# Patient Record
(demographics unavailable — no encounter records)

---

## 2024-10-21 NOTE — HEALTH RISK ASSESSMENT
[0] : 2) Feeling down, depressed, or hopeless: Not at all (0) [Never] : Never [PHQ-2 Negative - No further assessment needed] : PHQ-2 Negative - No further assessment needed [Time Spent: ___ Minutes] : I spent [unfilled] minutes performing a depression screening for this patient. [[id number]] : 0

## 2024-10-21 NOTE — PHYSICAL EXAM
[Normal] : affect was normal and insight and judgment were intact [de-identified] : erythema over right heel

## 2024-10-21 NOTE — HEALTH RISK ASSESSMENT
[0] : 2) Feeling down, depressed, or hopeless: Not at all (0) [Never] : Never [PHQ-2 Negative - No further assessment needed] : PHQ-2 Negative - No further assessment needed [Time Spent: ___ Minutes] : I spent [unfilled] minutes performing a depression screening for this patient. [[id number]] : 0

## 2024-10-21 NOTE — HISTORY OF PRESENT ILLNESS
[FreeTextEntry1] : Pt is here for a follow up visit.  [de-identified] : 81M PMHx HTN, HLD, degenerative lumbar disc disease, gout, BPH, and cataracts s/p surgery in 2023, here most recently in 9/2024 for acute gout flair s/p colchicine, presents for follow up appointment. Daughter present, translated for us. Items discussed today:  1) Gout. His recent acute gout flair of his R foot resolved with colchicine, but he had a second milder and self-resolving attack on his R heel. He complained of residual mild pain and redness over the affected area.  2) Back pain. He complained of chronic, stable, mild-moderate lower back pain with radiation down both legs, improves with leaning forward, ambulates with walker. Trialed PT, but says didn't help. Using OTC pain med. Not interested in surgery.   3) HTN. Takes his losartan 100mg, home BP 120s/70s.   4) Allergies. Has seasonal allergies, wants fluticasone propionate nasal spray 50mcg refill.   5) Hearing. Complained of chronic, progressive b/l hearing loss over years. Daughter confirmed he has difficulty with hearing, sometimes doesn't answer. Would like to see if hearing aids a possibility.

## 2024-10-21 NOTE — PHYSICAL EXAM
[Normal] : affect was normal and insight and judgment were intact [de-identified] : erythema over right heel

## 2024-10-21 NOTE — HISTORY OF PRESENT ILLNESS
[FreeTextEntry1] : Pt is here for a follow up visit.  [de-identified] : 81M PMHx HTN, HLD, degenerative lumbar disc disease, gout, BPH, and cataracts s/p surgery in 2023, here most recently in 9/2024 for acute gout flair s/p colchicine, presents for follow up appointment. Daughter present, translated for us. Items discussed today:  1) Gout. His recent acute gout flair of his R foot resolved with colchicine, but he had a second milder and self-resolving attack on his R heel. He complained of residual mild pain and redness over the affected area.  2) Back pain. He complained of chronic, stable, mild-moderate lower back pain with radiation down both legs, improves with leaning forward, ambulates with walker. Trialed PT, but says didn't help. Using OTC pain med. Not interested in surgery.   3) HTN. Takes his losartan 100mg, home BP 120s/70s.   4) Allergies. Has seasonal allergies, wants fluticasone propionate nasal spray 50mcg refill.   5) Hearing. Complained of chronic, progressive b/l hearing loss over years. Daughter confirmed he has difficulty with hearing, sometimes doesn't answer. Would like to see if hearing aids a possibility.

## 2024-10-21 NOTE — ASSESSMENT
[FreeTextEntry1] : 81M PMHx HTN, HLD, degenerative lumbar disc disease, gout, BPH, and cataracts s/p surgery in 2023, here most recently in 9/2024 for acute gout flair s/p colchicine, presents for follow up appointment. Daughter present, translated for us.  #Gout - having recent acute gout flares, despite previous remission on allopurinol for years. first flare in 9/2024 managed with colchicine. presenting today on tail-end of second, milder flare of R heel that he managed with OTC pain meds - plan is to up-titrate allopurinol dose according to uric acid level, which we got today, to prevent future flares  #Degenerative disc disease - degenerative lumbar disc disease with multi-level disc herniation w/o spinal stenosis per recent MRI ordered by PM&R that he sees of back pain control. causing him chronic but stable mild-moderate lumbar back pain that radiates down b/l legs, forcing him to use walker to ambulate, PT ineffective. Not interest in surgery - plan is conservative pain management, exercise  #HTN - chronic, stable, controlled - c/w losartan 100mg  #Hearing loss - chronic, progressive b/l hearing loss, most likely presbycusis - audiology referral  #Seasonal allergies - wants fluticasone propionate nasal spray 50mcg refill  #BPH - chronic, stable, controlled on tamsulosin 0.4mg - c/w home med  #HLD - c/w atorvastatin 20mg  #Cataract - s/p cataract surgery in 2023

## 2024-10-21 NOTE — END OF VISIT
[] : Resident [FreeTextEntry3] : 81M w/htn, gout, hld, bph, chronic back pain here for f/u. HTN - repeat SBP 150s, pt to bring monitor at next visit, c/w current regiment Gout - 2 flares in last month, first required colchicine, check uric acid level, increase Allopurinol 200mg. If continues to occur, will refer to rheum Chronic back pain - seeing PMR

## 2024-11-03 NOTE — REVIEW OF SYSTEMS
[Patient Intake Form Reviewed] : Patient intake form was reviewed Imiquimod Counseling:  I discussed with the patient the risks of imiquimod including but not limited to erythema, scaling, itching, weeping, crusting, and pain.  Patient understands that the inflammatory response to imiquimod is variable from person to person and was educated regarded proper titration schedule.  If flu-like symptoms develop, patient knows to discontinue the medication and contact us.

## 2024-11-04 NOTE — ASSESSMENT
[FreeTextEntry1] :                                                       Assessment/Plan:   DARREN GROSS is a 81 year male with chronic low back pain here for follow up telemedicine visit  Used  Julio Cesar [id number]   Arthritis of right midfoot (715.97) (M19.071) Neurogenic claudication due to lumbar spinal stenosis (724.03) (M48.062) Compression fracture of L2 vertebra, initial encounter (805.4) (S32.020A) Impairment of balance (781.2) (R26.89) Tendinopathy of gluteus medius (727.9) (M67.959) Weakness of hip (729.89) (R29.898) Myofascial pain syndrome of lumbar spine (729.1) (M79.18)  Neurogenic claudication due to lumbar spinal stenosis Chronic lumbar radiculopathy, L4, right Decreased ROM of lumbar spine Tendinopathy of gluteus medius, B/L History of vertebral fracture (traumatic) Balance impairment  Intermittent claudication, arterial insufficiency (?)  Unsteady gait   Bilateral leg numbness Dyspnea on exertion H/O Gout H/O osteopenia [DEXA 2021]   - Tiers of treatment and management of above diagnosis(es) were discussed with patient - Optimal diet, weight, sleep, and lifestyle management to minimize stress and maximize well being counseling provided - Imaging reviewed and discussed with patient - Reviewed previous encounter notes from 5/1/2024 Dr. BAILEY Fermin (Internal Medicine) and 4/29/2024 Dr. Colbert (Internal Medicine) - Nov 04, 2024 patient was advised to RESUME a structured, targeted therapy program 2-3x/wk for 8 wks with goal toward HEP - Patient was educated on an appropriate home exercise program, provided with exercise recommendations, all questions answered - Patient may trial acetaminophen 1000mg up to TID PRN moderate to severe pain and to decrease average consumption of NSAIDs - Nov 04, 2024 : Patient was advised to start gabapentin for their neuropathic pain symptoms. Patient was instructed to begin with 100mg at bedtime for the next week. If well tolerated, patient will double their nightly dose to 200mg at bedtime. After another week, if the medication is well tolerated, they will commence 300mg at bedtime. Patient provided with written instructions as well. All questions were answered and the patient displayed a clear understanding of the plan of care, including titration of gabapentin. The patient was informed about not taking this medication at the same time as any sleeping or muscle relaxant pills. Pt was also notified to stop, and contact our office, if it is too sedating, ankle swelling occurs and/or they experience suicidal thinking. - Patient was advised to apply cool compresses or warm heat to affected regions PRN - Ravi 10, 2024: Vascular Surgery: years of progressively worsening claudication symptoms, lower limb stigmata of arterial insufficiency, evaluate and treat; noted interval consultation on Nov 04, 2024 from 7/10/2024 without indication for surgical intervention - Radiographs of LUMBOSACRAL and HIPS region ordered 06/10/2024 - Nov 04, 2024 referral to Podiatry for midfoot arthritis  - Educated about red flag symptoms including (but not limited to) new, worsened, or persistent: fever greater than 100F, bowel or bladder incontinence, bowel obstipation, inability to void urine, urinary leakage, Severe nausea or vomiting, Worsening numbness, worsening tingling/paresthesias, and/or new or progressive motor weakness; advised to seek immediate medical attention at his nearest Emergency department should they experience any of the above    - Follow up in person in 3-4 months to assess their progress with new PT program, asses effect of gabapentin, review interval consultations with Podiatry and Orthopedic Surgery Spine Surgery, assess need for local treatment   I have personally spent a total of at least 45 minutes preparing, reviewing internal and external records, explaining, counseling, providing necessary information via documented paperwork for this encounter, and coordinating care for this patient encounter.   Thank you, Dr(s), for allowing me to participate in the care of your patient. Please do not hesitate to contact me with questions/concerns.   Antony Morgan M.D. Sports and Spine Department of Physical Medicine and Rehabilitation [facility] [address] [place] [address]   Appointments: [phone] Fax: [phone]

## 2024-11-04 NOTE — DATA REVIEWED
[FreeTextEntry1] : ACC: [id number]     EXAM:  MR SPINE LUMBAR   ORDERED BY: OLLIE SUNITA PROCEDURE DATE:  08/26/2024 NTERPRETATION:  CLINICAL INFORMATION: Remote fracture. ADDITIONAL CLINICAL INFORMATION: Scoliosis M41.9 TECHNIQUE: Multiplanar, multisequence MRI was performed of the lumbar spine. IV Contrast: NONE  PRIOR STUDIES: No priors available for comparison.  FINDINGS:  There is a moderate wedge chronic compression fracture of the L2 vertebral body. There is no evidence of retropulsion of fracture fragments. There is posterior curvature/reversal of the lumbar spine  with apex at the L2 level. There is mild levoscoliosis of the lower thoracolumbar spine with apex at approximately L2/L3. There is mild exaggeration of the lower lumbar  lordosis. There are anterior bridging osteophytes.. The remaining vertebral bodies are normal height. There is loss of T2 signal throughout the lumbar disc spaces with anterior osteophyte formation consistent with mild desiccation and degenerative changes.. There is T1 and T2 hyperintensity throughout   the L1/L2 disc space that likely represents calcification. The conus terminates at the L1/L2 level and demonstrates no evidence of abnormal signal changes. There is a small 0.8 cm Tarlov cyst seen at S2. There is mild diffuse T1 and T2 signal hyperintensity throughout the bone marrow that may represent osteopenia. Evaluation of the individual levels demonstrates at the L5/S1 level there is a left asymmetric diffuse disc bulge flattening the ventral thecal sac. There is mild bilateral foraminal narrowing. The bilateral L5 foraminal exiting nerve roots are within normal limits. There is moderate facet and ligamentous hypertrophy. There is no evidence of spinal canal stenosis. At the L4/L5 level there is a mild diffuse disc bulge flattening the ventral thecal sac and contacting the left descending L5 nerve root. There is left lateral recess narrowing. There is mild-to-moderate bilateral foraminal narrowing. The bilateral L4 foraminal exiting nerve roots appear within normal limits. There is moderate facet hypertrophy. There is no evidence of spinal canal stenosis. At the L3/L4 level there is a mild diffuse disc bulge flattening the ventral thecal sac and mildly contacting the left descending L4 nerve root. There is mild left lateral recess narrowing.. There is moderate bilateral  foraminal narrowing. The bilateral L3 foraminal exiting nerve roots are likely within normal limits. There is moderate facet and ligamentous hypertrophy. There is no evidence of spinal canal stenosis. At the L2/L3 level there is no evidence of a focal disc herniation. There is mild left foraminal narrowing the right neuroforamen is patent. The bilateral L2 foraminal exiting nerve roots are within normal limits. There is moderate facet  hypertrophy. There is no evidence of spinal canal stenosis. At the L1/L2 level there is a right foraminal and far lateral broad-based disc protrusion and osteophyte mildly narrowing the right neural foramen. The left neuroforamen is patent. There is moderate facet and ligamentous hypertrophy. There is no evidence of spinal canal stenosis. Impression: Moderate wedge chronic compression fracture of the L2 vertebral body with no evidence of retropulsion of fracture fragments. Posterior curvature/reversal of the lumbar spine  with apex at the L2 level. Mild levoscoliosis of the lower thoracolumbar spine with apex at approximately L2/L3. Mild exaggeration of the lower lumbar  lordosis. Degenerative changes of the lumbar spine. L5/S1  left asymmetric diffuse disc bulge. L5/S1 no evidence of spinal canal stenosis. L4/L5  mild diffuse disc bulge  contacting the left descending L5 nerve root.  L4/L5 no evidence of spinal canal stenosis. L3/L4  mild diffuse disc bulge  mildly contacting the left descending L4 nerve root. L3/L4 no evidence of spinal canal stenosis. L2/L3  no evidence of a focal disc herniation. L2/L3 no evidence of spinal canal stenosis. L1/L2 right foraminal and far lateral broad-based disc protrusion and osteophyte mildly narrowing the right neural foramen. L1/L2 no evidence of spinal canal stenosis.

## 2024-11-04 NOTE — REASON FOR VISIT
[Home] : at home, [unfilled] , at the time of the visit. [Medical Office: ([facility])___] : at the medical office located in  [Patient] : the patient [Follow-Up] : a follow-up visit [Family Member] : family member
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
## 2024-11-08 NOTE — DISCUSSION/SUMMARY
[de-identified] : Reviewed the natural history of adult spinal deformity at length.  Reviewed all available treatment options.  From a surgical perspective I do feel that this would likely require multilevel realignment and fusion procedure.  Reviewed the relative risk benefits alternatives anticipated recovery from this.  Presently we have agreed upon a new course of physical therapy and chiropractic treatment attempt to strengthen the musculature of the mid low back and legs.  Will follow-up in 2 to 3 months to reassess his symptoms and progress  I have spent greater than 60 minutes preparing to see the patient, collecting relevant history, performing a thorough history and physical examination, counseling the patient regarding my findings ordering the appropriate therapies and tests, communicating with other relevant healthcare professionals, documenting my encounter and coordinating care. 36.9
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
## 2024-11-08 NOTE — PHYSICAL EXAM
[Stooped] : stooped [UE/LE] : Sensory: Intact in bilateral upper & lower extremities [Normal DTR Reflexes] : DTR reflexes normal [Normal Proprioception] : sensation intact for proprioception [Normal] : Oriented to person, place, and time, insight and judgement were intact and the affect was normal [de-identified] : MRI lumbar spine LHR Consistent with chronic compression fracture deformity at L2 with segmental kyphosis  AP lateral scoliosis x-ray PACS 11/8/2024 Significant loss of lumbar lordosis with focal thoracolumbar kyphosis posterior pelvic tilts and compensatory flattening of thoracic kyphosis

## 2024-11-08 NOTE — ASSESSMENT
[FreeTextEntry1] : 81-year-old male with thoracolumbar kyphosis due to chronic L2 compression fracture sagittal plane imbalance, symptomatic

## 2025-02-10 NOTE — HISTORY OF PRESENT ILLNESS
[FreeTextEntry1] : htn [de-identified] : 81M PMHx HTN, HLD, degenerative lumbar disc disease, gout, BPH, and cataracts s/p surgery in 2023 who presents for follow up of BP. Takes BP at home with BP cuff, sometimes sees 150s. Sometimes has light transient headache. Takes medication every day. Drinks 2 cups of coffee. Does not snore.

## 2025-02-10 NOTE — HEALTH RISK ASSESSMENT
[Little interest or pleasure doing things] : 1) Little interest or pleasure doing things [Feeling down, depressed, or hopeless] : 2) Feeling down, depressed, or hopeless [0] : 2) Feeling down, depressed, or hopeless: Not at all (0) [PHQ-9 Negative - No further assessment needed] : PHQ-9 Negative - No further assessment needed [PSA2Sxtzs] : 0 [Never] : Never

## 2025-02-10 NOTE — PHYSICAL EXAM
[Normal Sclera/Conjunctiva] : normal sclera/conjunctiva [No Accessory Muscle Use] : no accessory muscle use [Clear to Auscultation] : lungs were clear to auscultation bilaterally [Normal Rate] : normal rate  [Regular Rhythm] : with a regular rhythm [No Murmur] : no murmur heard [No Edema] : there was no peripheral edema [de-identified] : glasses

## 2025-02-10 NOTE — HISTORY OF PRESENT ILLNESS
[FreeTextEntry1] : htn [de-identified] : 81M PMHx HTN, HLD, degenerative lumbar disc disease, gout, BPH, and cataracts s/p surgery in 2023 who presents for follow up of BP. Takes BP at home with BP cuff, sometimes sees 150s. Sometimes has light transient headache. Takes medication every day. Drinks 2 cups of coffee. Does not snore.

## 2025-02-10 NOTE — PHYSICAL EXAM
[Normal Sclera/Conjunctiva] : normal sclera/conjunctiva [No Accessory Muscle Use] : no accessory muscle use [Clear to Auscultation] : lungs were clear to auscultation bilaterally [Normal Rate] : normal rate  [Regular Rhythm] : with a regular rhythm [No Murmur] : no murmur heard [No Edema] : there was no peripheral edema [de-identified] : glasses

## 2025-02-10 NOTE — HEALTH RISK ASSESSMENT
[Little interest or pleasure doing things] : 1) Little interest or pleasure doing things [Feeling down, depressed, or hopeless] : 2) Feeling down, depressed, or hopeless [0] : 2) Feeling down, depressed, or hopeless: Not at all (0) [PHQ-9 Negative - No further assessment needed] : PHQ-9 Negative - No further assessment needed [CQI3Slwca] : 0 [Never] : Never

## 2025-02-10 NOTE — END OF VISIT
[] : Resident [FreeTextEntry3] : 82M w/HTN, HLD, degenerative lumbar disc disease, gout, BPH here for f/u. HTN - elevated, reviewed home log, add amlodipine 5mg, VEB for BP check in 2 weeks. HLD - c/w statin, check lipids at [facility] 20 today

## 2025-02-10 NOTE — PLAN
[FreeTextEntry1] : telehealth for BP log check in 2weeks annual in April - bloodtests in April + urine albumin

## 2025-02-24 NOTE — HISTORY OF PRESENT ILLNESS
[Home] : at home, [unfilled] , at the time of the visit. [Medical Office: ([facility])___] : at the medical office located in  [Telehealth (audio & video)] : This visit was provided via telehealth using real-time 2-way audio visual technology. [Verbal consent obtained from patient] : the patient, [unfilled] [FreeTextEntry1] : f/u home BPs  [de-identified] : 81M PMHx HTN, HLD, degenerative lumbar disc disease, gout, BPH, and cataracts (s/p surgery 2023) who presents for follow up of BP after initiating amlodipine 5mg. Pt had elevated BPs on only losartan 100mg qd. Was recently initiated on amlodipine 5mg qd in addition. Has been taking BPs once daily in the AM, readings 132/68, 133/63. Denies dizziness, lightheadedness, however notes has had some episodes of feeling unsteady while walking and needing walker. Has not taken BP during these episodes.

## 2025-02-24 NOTE — PHYSICAL EXAM
[Normal] : no acute distress, well nourished, well developed and well-appearing [de-identified] : physical exam otherwise limited by telehealth nature of exam

## 2025-02-24 NOTE — END OF VISIT
[] : Resident [FreeTextEntry3] : 82M w/htn for VEB for BP check. BPs better controlled w/addition of amlodipine 5mg. However, pt does endorse gait imbalance intermittently. Recommended to check BP at that time AND stagger losartan and amlodipine, currently taking both together.  VEB in 2 weeks to review BPs again, if still feeling off balance, will cut amlodipine to 2.5mg.

## 2025-02-24 NOTE — ASSESSMENT
[FreeTextEntry1] : 81M PMHx HTN, HLD, degenerative lumbar disc disease, gout, BPH, and cataracts (s/p surgery 2023) who presents for follow up of BP after initiating amlodipine 5mg.  Case discussed with Dr. Amna Bruce.

## 2025-03-10 NOTE — HISTORY OF PRESENT ILLNESS
[Spouse] : spouse [Other:____] : [unfilled] [Home] : at home, [unfilled] , at the time of the visit. [Medical Office: ([facility])___] : at the medical office located in  [Telehealth (audio & video)] : This visit was provided via telehealth using real-time 2-way audio visual technology. [Verbal consent obtained from patient] : the patient, [unfilled] [FreeTextEntry1] : BP follow up [de-identified] : 82M PMHx HTN, HLD, degenerative lumbar disc disease, gout, BPH, and cataracts s/p surgery in 2023 who presents for virtual visit for BP follow up.  At last visit - Kept BP log at home. BPs low 130s/60s. Has experienced more of an unsteady walk and feeling off balance but no dizziness/lightheadedness/leg swelling. Thus was advised to stagger time of administration of 2 BP meds (1 in AM and 1 midday). Advised to take BP if having episode of feeling unsteady. Has been taking BP at home and getting number of 120s/60s-70s. Denies feeling weak, denies feeling like he is going to pass out. Overall he feels well. Taking amlodipine 5mg daily and losartan 100mg. Denies chest pain/tightness.  Also interested in renewing PT. Uses the PT for the back pain. Currently going twice per week.

## 2025-03-10 NOTE — PHYSICAL EXAM
[No Acute Distress] : no acute distress [PERRL] : pupils equal round and reactive to light [EOMI] : extraocular movements intact [Normal Outer Ear/Nose] : the outer ears and nose were normal in appearance [No Respiratory Distress] : no respiratory distress  [Normal Affect] : the affect was normal

## 2025-03-10 NOTE — REVIEW OF SYSTEMS
[Fever] : no fever [Chills] : no chills [Night Sweats] : no night sweats [Discharge] : no discharge [Earache] : no earache [Chest Pain] : no chest pain [Palpitations] : no palpitations [Orthopena] : no orthopnea [Shortness Of Breath] : no shortness of breath [Wheezing] : no wheezing [Cough] : no cough [Abdominal Pain] : no abdominal pain [Dysuria] : no dysuria [Joint Pain] : no joint pain [Itching] : no itching [Headache] : no headache

## 2025-03-31 NOTE — HEALTH RISK ASSESSMENT
[No] : In the past 12 months have you used drugs other than those required for medical reasons? No [No falls in past year] : Patient reported no falls in the past year [0] : 2) Feeling down, depressed, or hopeless: Not at all (0) [PHQ-2 Negative - No further assessment needed] : PHQ-2 Negative - No further assessment needed [[id number]] : 0 [With Patient/Caregiver] : , with patient/caregiver [AdvancecareDate] : 03/25 [FreeTextEntry4] : Discussed the importance of having a healthcare proxy to ensure that medical decisions are made according to the patient's wishes if they are unable to communicate. Encouraged patient to follow up with their primary care provider at the next visit to discuss this further. Patient can complete the form at the PCP office or download it from the [place] website. [Never] : Never

## 2025-03-31 NOTE — PHYSICAL EXAM
[de-identified] : Telehealth precludes traditional, comprehensive physical exam. Patient appeared stable and alert.

## 2025-03-31 NOTE — HISTORY OF PRESENT ILLNESS
[This encounter was initiated by telehealth (audio with video) and converted to telephone (audio only)] : This encounter was initiated by telehealth (audio with video) and converted to telephone (audio only) [FreeTextEntry1] : fx back L2 more than 30 years fell from tree [Home] : at home, [unfilled] , at the time of the visit. [Other Location: e.g. Home (Enter Location, City,State)___] : at [unfilled] [Technical] : patient unable to effectively utilize tele-video due to technical issues. [Verbal consent obtained from patient] : the patient, [unfilled] [FreeTextEntry3] : daughter Sopida [de-identified] : [facility] quality initiative provider note: 82 year old male with history of multiple medical issues. Denies hospitalization, surgery, or new medical condition including COVID in the past year. Patient is being seen today to confirm diagnoses reported from [facility]. Patient report doing well. No complaints offered at this time. Awake, alert and oriented x4, in no acute distress. Denies any symptoms or concerns at this time. Daughter verified medications and medical diagnosis. Report taking medications as prescribed.  Compression Fracture Daughter and patient confirmed he sustain an L2 compression fracture over 30 years ago due to falling out of a tree. Denies any recent injuries. His condition is managed by Dr. Morgan ([facility] Medicine [facility] Re[facility]), who he last seen on 11/4/24. Patient c/o persistent right lower limb pain and numbness in the hip/legs. Patient attending PT session, without significant lasting relief. There have been no significant changes to aggravating or alleviating factors. Pharmacologic treatments now include OTC analgesics PRN, but otherwise pharmacologic treatments are minimal. Denies new or worsened numbness, tingling, or focal motor deficit. Denies interval fall, accident, or injury. Denies change in bowel or bladder functioning.  MR SPINE LUMBAR - 08/26/2024 Impression: - Moderate wedge chronic compression fracture of the L2 vertebral body with no evidence of retropulsion of fracture fragments. - Posterior curvature/reversal of the lumbar spine with apex at the L2 level. - Mild levoscoliosis of the lower thoracolumbar spine with apex at approximately L2/L3. - Mild exaggeration of the lower lumbar lordosis. Degenerative changes of the lumbar spine. - L5/S1  left asymmetric diffuse disc bulge. L5/S1 no evidence of spinal canal stenosis. - L4/L5  mild diffuse disc bulge contacting the left descending L5 nerve root.  L4/L5 no evidence of spinal canal stenosis. - L3/L4  mild diffuse disc bulge mildly contacting the left descending L4 nerve root. L3/L4 no evidence of spinal canal stenosis. - L2/L3  no evidence of a focal disc herniation. L2/L3 no evidence of spinal canal stenosis. - L1/L2 right foraminal and far lateral broad-based disc protrusion and osteophyte mildly narrowing the right neural foramen. L1/L2 no evidence of spinal canal stenosis.

## 2025-03-31 NOTE — REVIEW OF SYSTEMS
[Back Pain] : back pain [Claudication] : leg claudication [Joint Pain] : joint pain [Joint Stiffness] : joint stiffness [Joint Swelling] : joint swelling [Negative] : Heme/Lymph [de-identified] : Numbness and tingling

## 2025-03-31 NOTE — HISTORY OF PRESENT ILLNESS
Detail Level: Detailed X Size Of Lesion In Cm (Optional): 0 [This encounter was initiated by telehealth (audio with video) and converted to telephone (audio only)] : This encounter was initiated by telehealth (audio with video) and converted to telephone (audio only) [FreeTextEntry1] : fx back L2 more than 30 years fell from tree [Home] : at home, [unfilled] , at the time of the visit. [Other Location: e.g. Home (Enter Location, City,State)___] : at [unfilled] [Technical] : patient unable to effectively utilize tele-video due to technical issues. [Verbal consent obtained from patient] : the patient, [unfilled] [FreeTextEntry3] : daughter Sopida [de-identified] : [facility] quality initiative provider note: 82 year old male with history of multiple medical issues. Denies hospitalization, surgery, or new medical condition including COVID in the past year. Patient is being seen today to confirm diagnoses reported from [facility]. Patient report doing well. No complaints offered at this time. Awake, alert and oriented x4, in no acute distress. Denies any symptoms or concerns at this time. Daughter verified medications and medical diagnosis. Report taking medications as prescribed.  Compression Fracture Daughter and patient confirmed he sustain an L2 compression fracture over 30 years ago due to falling out of a tree. Denies any recent injuries. His condition is managed by Dr. Morgan ([facility] Medicine [facility] Rehab), who he last seen on 11/4/24. Patient c/o persistent right lower limb pain and numbness in the hip/legs. Patient attending PT session, without significant lasting relief. There have been no significant changes to aggravating or alleviating factors. Pharmacologic treatments now include OTC analgesics PRN, but otherwise pharmacologic treatments are minimal. Denies new or worsened numbness, tingling, or focal motor deficit. Denies interval fall, accident, or injury. Denies change in bowel or bladder functioning.  MR SPINE LUMBAR - 08/26/2024 Impression: - Moderate wedge chronic compression fracture of the L2 vertebral body with no evidence of retropulsion of fracture fragments. - Posterior curvature/reversal of the lumbar spine with apex at the L2 level. - Mild levoscoliosis of the lower thoracolumbar spine with apex at approximately L2/L3. - Mild exaggeration of the lower lumbar lordosis. Degenerative changes of the lumbar spine. - L5/S1  left asymmetric diffuse disc bulge. L5/S1 no evidence of spinal canal stenosis. - L4/L5  mild diffuse disc bulge contacting the left descending L5 nerve root.  L4/L5 no evidence of spinal canal stenosis. - L3/L4  mild diffuse disc bulge mildly contacting the left descending L4 nerve root. L3/L4 no evidence of spinal canal stenosis. - L2/L3  no evidence of a focal disc herniation. L2/L3 no evidence of spinal canal stenosis. - L1/L2 right foraminal and far lateral broad-based disc protrusion and osteophyte mildly narrowing the right neural foramen. L1/L2 no evidence of spinal canal stenosis.

## 2025-03-31 NOTE — PHYSICAL EXAM
[de-identified] : Telehealth precludes traditional, comprehensive physical exam. Patient appeared stable and alert.

## 2025-03-31 NOTE — REVIEW OF SYSTEMS
[Back Pain] : back pain [Claudication] : leg claudication [Joint Pain] : joint pain [Joint Stiffness] : joint stiffness [Joint Swelling] : joint swelling [Negative] : Heme/Lymph [de-identified] : Numbness and tingling

## 2025-03-31 NOTE — HEALTH RISK ASSESSMENT
[No] : In the past 12 months have you used drugs other than those required for medical reasons? No [No falls in past year] : Patient reported no falls in the past year [0] : 2) Feeling down, depressed, or hopeless: Not at all (0) [PHQ-2 Negative - No further assessment needed] : PHQ-2 Negative - No further assessment needed [[id number]] : 0 [With Patient/Caregiver] : , with patient/caregiver [AdvancecareDate] : 03/25 [FreeTextEntry4] : Discussed the importance of having a healthcare proxy to ensure that medical decisions are made according to the patient's wishes if they are unable to communicate. Encouraged patient to follow up with their primary care provider at the next visit to discuss this further. Patient can complete the form at the PCP office or download it from the [place] website. [Never] : Never

## 2025-04-11 NOTE — END OF VISIT
[] : Resident [FreeTextEntry3] :  82 year old M presenting for follow up, accompanied by his daughter. BP at goal. Appears well. Will renew BP meds. RTC 1 month for AWV.

## 2025-04-11 NOTE — HEALTH RISK ASSESSMENT
[Little interest or pleasure doing things] : 1) Little interest or pleasure doing things [Feeling down, depressed, or hopeless] : 2) Feeling down, depressed, or hopeless [0] : 2) Feeling down, depressed, or hopeless: Not at all (0) [PHQ-9 Negative - No further assessment needed] : PHQ-9 Negative - No further assessment needed [Never] : Never [[id number]] : 0

## 2025-04-11 NOTE — HISTORY OF PRESENT ILLNESS
[FreeTextEntry1] : Follow up visit [de-identified] : 82 y.o M w/ PMHx HTN, HLD, lumbar osteoporosis, gout, BPH and cataract s/p surgery presents for follow up requesting medication refills. Per pt and daughter at bedside, pt has been in good general state of health and no complains. No recent ED/ Urgent care visit and no longer complaining of balance issues since spacing out his anti-hypertensive medications (amlodipine and losartan). Pt is very much physically active and able to carry out all his ADLs. ROS otherwise neg.

## 2025-05-06 NOTE — HISTORY OF PRESENT ILLNESS
[Family Member] : family member [FreeTextEntry1] : CPE [de-identified] : 83 yo Juanjo speaking male with PMHx of HTN, HLD, L2 lumbar stenosis, cataracts s/p surgery, BPH presents for CPE. Patient has no major symptomatic complaints, and no falls in past year. He does endorse sometimes feeling dizzy when getting up. He was offered surgery for L2 compression fracture on Nov 2024 but declined as does not want surgery at his age. Back pain comes and goes and he finished the prescription of gabapentin given, though weekly acupuncture and physical therapy has helped him as well. Home BP log shows SBPs 120-140s. He has full ADLs, cooks for himself and walks daily with assistance of rolling walker, lives at home with daughter. Feels safe at home, non smoker (stopped 50 years ago), no alcohol use. Up to date on yearly eye and dental exams.

## 2025-05-06 NOTE — REVIEW OF SYSTEMS
[Back Pain] : back pain [Dizziness] : dizziness [Fever] : no fever [Chills] : no chills [Vision Problems] : no vision problems [Sore Throat] : no sore throat [Chest Pain] : no chest pain [Palpitations] : no palpitations [Shortness Of Breath] : no shortness of breath [Wheezing] : no wheezing [Cough] : no cough [Abdominal Pain] : no abdominal pain [Nausea] : no nausea [Vomiting] : no vomiting [Dysuria] : no dysuria [Hematuria] : no hematuria [Skin Rash] : no skin rash [Headache] : no headache [Fainting] : no fainting [Confusion] : no confusion [Memory Loss] : no memory loss [Anxiety] : no anxiety [Depression] : no depression

## 2025-05-06 NOTE — HEALTH RISK ASSESSMENT
[0] : 2) Feeling down, depressed, or hopeless: Not at all (0) [No falls in past year] : Patient reported no falls in the past year [Very Good] : ~his/her~ current health as very good [Excellent] : ~his/her~  mood as  excellent [No] : No [Never (0 pts)] : Never (0 points) [Yes] : takes [Muscle Relaxants] : muscle relaxants [Former] : Former [> 15 Years] : > 15 Years [Patient reported colonoscopy was normal] : Patient reported colonoscopy was normal [With Family] : lives with family [Retired] : retired [High School] : high school [Feels Safe at Home] : Feels safe at home [[facility]] : 10 [[id number]] : 0 [Change in mental status noted] : No change in mental status noted [Language] : denies difficulty with language [Behavior] : denies difficulty with behavior [Reports changes in hearing] : Reports no changes in hearing [Reports changes in vision] : Reports no changes in vision [Reports changes in dental health] : Reports no changes in dental health [ColonoscopyDate] : 2011

## 2025-05-06 NOTE — END OF VISIT
[] : Resident [FreeTextEntry3] : #gout- he doesn't take allopurinol regularly. last attack was 5 mos ago- stop for now and monitor as not been taking it. reduce polypharmacy #HTN- home bps 120s-140s. 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

## 2025-05-06 NOTE — PHYSICAL EXAM
[___/1] : [unfilled]/1   [___/3] : [unfilled]/3 [___/5] : [unfilled]/5 [___/4] : [unfilled]/4 [___/2] : [unfilled]/2 [___/8] : [unfilled]/8 [Mild Neurocognative Disorder] : Mild Neurocognative Disorder [No Acute Distress] : no acute distress [Well Nourished] : well nourished [Well Developed] : well developed [Well-Appearing] : well-appearing [Normal Sclera/Conjunctiva] : normal sclera/conjunctiva [PERRL] : pupils equal round and reactive to light [EOMI] : extraocular movements intact [Normal Outer Ear/Nose] : the outer ears and nose were normal in appearance [Normal Oropharynx] : the oropharynx was normal [No JVD] : no jugular venous distention [No Lymphadenopathy] : no lymphadenopathy [Supple] : supple [No Respiratory Distress] : no respiratory distress  [No Accessory Muscle Use] : no accessory muscle use [Clear to Auscultation] : lungs were clear to auscultation bilaterally [Normal Rate] : normal rate  [Regular Rhythm] : with a regular rhythm [Normal S1, S2] : normal S1 and S2 [No Edema] : there was no peripheral edema [Soft] : abdomen soft [No Joint Swelling] : no joint swelling [Grossly Normal Strength/Tone] : grossly normal strength/tone [No Rash] : no rash [Normal Gait] : normal gait [Normal Affect] : the affect was normal [Normal Insight/Judgement] : insight and judgment were intact [de-identified] : L2 mild TTP [TextBox_2] : yes [TextBox_4] : high school [SlumsTotal] : 23

## 2025-05-06 NOTE — PLAN
[FreeTextEntry1] : 83 yo Juanjo speaking male with PMHx of HTN, HLD, L2 lumbar stenosis, cataracts s/p surgery, BPH presents for CPE.   #HCM Up to date on vaccinations and screenings. No falls in past year. Has full ADLs. A1c 5.8% last visit -CBC, CMP, A1c, Lipid panel today -Refilled atorvastatin 20 mg qd  #HTN Hx of HTN, home BP log with SBPs 120-140s. On amlodipine 5 mg and losartan 10 mg. Occasional dizziness when getting up but orthostatic negative in office. -c/w home BP meds. RTC if worsening dizziness or home SBPs <120  #L2 Lumbar Stenosis Hx of L2 lumbar stenosis, evaluated by Dr. Babin in Nov 2024, recommneded for surgery but declined at that time. Since then has done PT and finished gabapentin prescribed. Has weekly acupuncture which helps with some symptomatic relief. -Referral to orthopedics again for other nonsurgical interventions -PT referral renewal -Gabapentin refilled for 30 day x 1 refill prescription. Advised to cautiously use if dizziness worsens   #BPH  Hx of BPH, on home med tamsulosin 0.4 mg. Some mild weak urinary stream at times -C/w tamsulosin 0.4 mg daily  RTC in 3-6 months for HTN follow up. Precepted with Dr. Tima Carr.